# Patient Record
Sex: MALE | Race: OTHER | URBAN - METROPOLITAN AREA
[De-identification: names, ages, dates, MRNs, and addresses within clinical notes are randomized per-mention and may not be internally consistent; named-entity substitution may affect disease eponyms.]

---

## 2022-05-03 ENCOUNTER — CONSULTATION/EVALUATION (OUTPATIENT)
Dept: URBAN - METROPOLITAN AREA CLINIC 21 | Facility: CLINIC | Age: 87
End: 2022-05-03

## 2022-05-03 DIAGNOSIS — H25.813: ICD-10-CM

## 2022-05-03 DIAGNOSIS — H35.363: ICD-10-CM

## 2022-05-03 PROCEDURE — 92014 COMPRE OPH EXAM EST PT 1/>: CPT

## 2022-05-03 PROCEDURE — 92134 CPTRZ OPH DX IMG PST SGM RTA: CPT

## 2022-05-03 ASSESSMENT — KERATOMETRY
OS_K1POWER_DIOPTERS: 42.50
OD_AXISANGLE2_DEGREES: 123
OS_AXISANGLE2_DEGREES: 9
OD_K2POWER_DIOPTERS: 43.00
OD_AXISANGLE_DEGREES: 33
OS_AXISANGLE_DEGREES: 099
OS_K2POWER_DIOPTERS: 42.75
OD_K1POWER_DIOPTERS: 42.25

## 2022-05-03 ASSESSMENT — TONOMETRY
OS_IOP_MMHG: 10
OD_IOP_MMHG: 11

## 2022-05-03 ASSESSMENT — VISUAL ACUITY
OS_SC: 20/200
OU_SC: 20/200
OD_SC: 20/200

## 2022-05-03 NOTE — PATIENT DISCUSSION
Surgery Counseling: I have discussed the option of glasses versus cataract surgery versus following. It was explained that when the patient’s vision no longer meets their visual needs and a glasses prescription does not improve visual symptoms, the option of cataract surgery is a reasonable next step. It was explained that there is no guarantee that removing the cataract will improve their visual symptoms, however; it is believed that the cataract is contributing to the patient's visual impairment and surgery may improve both the visual and functional status of the patient. The risks, benefits and alternatives of surgery were discussed with the patient. After this discussion, the patient desires to proceed with cataract surgery with implantation of an intraocular lens to improve vision for emmetropia.

## 2022-05-20 ENCOUNTER — PRE-OP/H&P (OUTPATIENT)
Dept: URBAN - METROPOLITAN AREA CLINIC 20 | Facility: CLINIC | Age: 87
End: 2022-05-20

## 2022-05-20 VITALS
DIASTOLIC BLOOD PRESSURE: 70 MMHG | SYSTOLIC BLOOD PRESSURE: 115 MMHG | HEART RATE: 66 BPM | WEIGHT: 170 LBS | BODY MASS INDEX: 23.03 KG/M2 | HEIGHT: 72 IN

## 2022-05-20 DIAGNOSIS — H25.813: ICD-10-CM

## 2022-05-20 PROCEDURE — 99211PRE PRE OP VISIT

## 2022-05-20 PROCEDURE — 92136 OPHTHALMIC BIOMETRY: CPT

## 2022-05-27 ASSESSMENT — KERATOMETRY
OD_K1POWER_DIOPTERS: 42.25
OD_AXISANGLE_DEGREES: 33
OS_K1POWER_DIOPTERS: 42.50
OS_AXISANGLE_DEGREES: 099
OS_K2POWER_DIOPTERS: 42.75
OS_AXISANGLE2_DEGREES: 9
OD_K2POWER_DIOPTERS: 43.00
OD_AXISANGLE2_DEGREES: 123